# Patient Record
Sex: FEMALE
[De-identification: names, ages, dates, MRNs, and addresses within clinical notes are randomized per-mention and may not be internally consistent; named-entity substitution may affect disease eponyms.]

---

## 2018-02-25 ENCOUNTER — HOSPITAL ENCOUNTER (OUTPATIENT)
Dept: HOSPITAL 92 - L&D/OP | Age: 23
Discharge: HOME | End: 2018-02-25
Attending: FAMILY MEDICINE
Payer: COMMERCIAL

## 2018-02-25 VITALS — BODY MASS INDEX: 35.2 KG/M2

## 2018-02-25 VITALS — TEMPERATURE: 98.6 F | DIASTOLIC BLOOD PRESSURE: 70 MMHG | SYSTOLIC BLOOD PRESSURE: 127 MMHG

## 2018-02-25 DIAGNOSIS — Z79.899: ICD-10-CM

## 2018-02-25 DIAGNOSIS — Z3A.35: ICD-10-CM

## 2018-02-25 DIAGNOSIS — O46.93: Primary | ICD-10-CM

## 2018-02-25 NOTE — PDOC.LDHP
Labor and Delivery H&P


Chief complaint: other (episode of vaginal bleeding at 7 am, did not persist.)


HPI: 


21 yo  at 35 weeks by 1 T US who presents after isolated episode of 

bleeding.  She has felt baby move normally this morning.  no vaginal discharge 

before this event or after.  no gush of fluid.  no recent vaginal intercourse. 

no abdominal pain.  





Current gestational age (weeks): 35


Due date: 18


Dating criteria: last menstrual period, first trimester ultrasound


Grav: 3


Para: 2


OB History Details: 


1. Short pregnancy interval, otherwise normal





Current pregnancy complications: none


Abnormal US findings: No


Previous surgical history: other ( x2)


Social history: none





- Physical Exam


General: NAD, resting


Heart: RRR


Abdomen: NTTP (Gravid, no tenderness. soft abdomen)


Extremeties: no edema


FHT: category 1 (Variabilty moderate for majority of montoring, accelerations 

present.  no decelerations.)


[place] contractions every: every 5-6 minutes,  minimally symptomatic





- Vaginal Exam


cm dilated: 1 (Steril speculum done: small blood wiped away without continued 

bleeding.  no sources of bleeding in vagina or cervix.  no discharge.  no 

bleeding from cervical os.  )


Effacement: 0%


Station: -3





- OB Labs


Blood type: A


RH: positive


Antibody Screen: negative


HIV: negative


RPR: negative


HEPSAg: negative


1 hour GCT: positive


3 hour GTT: negative


GBS: unknown


Urine drug screen: not done


Rubella: immune





- Assessment


1. Vaginal bleeding2/2 to cervical irritation-  US done at bedside that 

confirms what previous reports have said:  no previa with anterior placenta.  

no active bleeding on sterile spec after small amount of blood was wiped away.  

no abdominal pain or non reactive FHT to suggest abruptio placentae.  No 

discharge to suggest infection.  no vaginal or cervical sources of bleeding.  

Likely 2/2 to benign cervical irriation but will monitor for 2 hours to ensure 

FHT continue to be normal and that patient is not in labor.  Patient is A+ 

blood type.  








<Sukh Gauthier - Last Filed: 18 10:04>





<Ofe Stewart - Last Filed: 18 11:44>


Allergies/Adverse Reactions: 


 Allergies











Allergy/AdvReac Type Severity Reaction Status Date / Time


 


No Known Allergies Allergy   Unverified 18 08:29














Attending Addendum





- Attending Addendum


Date/Time: 18 1141





I personally evaluated the patient and discussed the management with Dr. Gauthier


I agree with the History, Examination, Assessment and Plan documented above 

with any addition or exceptions noted below- 21 yo @35 weeks presented 

with episode of vaginal bleeding. Had small amount of spotting. No recent 

intercourse. (+) FM. Denies any ctx, LOF. Bedside USG confirmed no previa. SSE- 

small amount dark blood in vault; no active bleeding. Category 1 FHTs. SVE- 

closed/thick/high. [place]- ctx q5-7 min. Patient monitored in L&D with no further 

bleeding. Does not feel the ctx. Hydrating well. D/c home with follow-up at 

[facility]– [place].








<Ofe Stewart - Last Filed: 18 11:44>

## 2018-04-04 ENCOUNTER — HOSPITAL ENCOUNTER (INPATIENT)
Dept: HOSPITAL 92 - ERS | Age: 23
LOS: 4 days | Discharge: HOME | DRG: 776 | End: 2018-04-08
Attending: FAMILY MEDICINE | Admitting: FAMILY MEDICINE
Payer: COMMERCIAL

## 2018-04-04 VITALS — BODY MASS INDEX: 32.5 KG/M2

## 2018-04-04 DIAGNOSIS — N83.201: ICD-10-CM

## 2018-04-04 DIAGNOSIS — E87.2: ICD-10-CM

## 2018-04-04 DIAGNOSIS — B96.20: ICD-10-CM

## 2018-04-04 LAB
ALBUMIN SERPL BCG-MCNC: 4 G/DL (ref 3.5–5)
ALP SERPL-CCNC: 94 U/L (ref 40–150)
ALT SERPL W P-5'-P-CCNC: 40 U/L (ref 8–55)
ANION GAP SERPL CALC-SCNC: 16 MMOL/L (ref 10–20)
AST SERPL-CCNC: 24 U/L (ref 5–34)
BASOPHILS # BLD AUTO: 0 THOU/UL (ref 0–0.2)
BASOPHILS NFR BLD AUTO: 0 % (ref 0–1)
BILIRUB SERPL-MCNC: 0.6 MG/DL (ref 0.2–1.2)
BUN SERPL-MCNC: 12 MG/DL (ref 7–18.7)
CALCIUM SERPL-MCNC: 8.9 MG/DL (ref 7.8–10.44)
CHLORIDE SERPL-SCNC: 103 MMOL/L (ref 98–107)
CO2 SERPL-SCNC: 21 MMOL/L (ref 22–29)
CREAT CL PREDICTED SERPL C-G-VRATE: 0 ML/MIN (ref 70–130)
CRYSTAL-AUWI FLAG: 0.5 (ref 0–15)
EOSINOPHIL # BLD AUTO: 0 THOU/UL (ref 0–0.7)
EOSINOPHIL NFR BLD AUTO: 0.2 % (ref 0–10)
GLOBULIN SER CALC-MCNC: 3.2 G/DL (ref 2.4–3.5)
GLUCOSE SERPL-MCNC: 97 MG/DL (ref 70–105)
HEV IGM SER QL: 1.8 (ref 0–7.99)
HGB BLD-MCNC: 13.4 G/DL (ref 12–16)
HYALINE CASTS #/AREA URNS LPF: (no result) LPF
LYMPHOCYTES # BLD: 1.1 THOU/UL (ref 1.2–3.4)
LYMPHOCYTES NFR BLD AUTO: 8 % (ref 21–51)
MCH RBC QN AUTO: 29.7 PG (ref 27–31)
MCV RBC AUTO: 90 FL (ref 81–99)
MONOCYTES # BLD AUTO: 1.1 THOU/UL (ref 0.11–0.59)
MONOCYTES NFR BLD AUTO: 8.3 % (ref 0–10)
NEUTROPHILS # BLD AUTO: 11.4 THOU/UL (ref 1.4–6.5)
NEUTROPHILS NFR BLD AUTO: 83.5 % (ref 42–75)
PATHC CAST-AUWI FLAG: 0.29 (ref 0–2.49)
PLATELET # BLD AUTO: 224 THOU/UL (ref 130–400)
POTASSIUM SERPL-SCNC: 3.7 MMOL/L (ref 3.5–5.1)
PROT UR STRIP.AUTO-MCNC: 100 MG/DL
RBC # BLD AUTO: 4.52 MILL/UL (ref 4.2–5.4)
RBC UR QL AUTO: (no result) HPF (ref 0–3)
SODIUM SERPL-SCNC: 136 MMOL/L (ref 136–145)
SP GR UR STRIP: 1.02 (ref 1–1.04)
SPERM-AUWI FLAG: 0 (ref 0–9.9)
WBC # BLD AUTO: 13.6 THOU/UL (ref 4.8–10.8)
YEAST-AUWI FLAG: 0 (ref 0–25)

## 2018-04-04 PROCEDURE — A4216 STERILE WATER/SALINE, 10 ML: HCPCS

## 2018-04-04 PROCEDURE — 93005 ELECTROCARDIOGRAM TRACING: CPT

## 2018-04-04 PROCEDURE — 96365 THER/PROPH/DIAG IV INF INIT: CPT

## 2018-04-04 PROCEDURE — 96375 TX/PRO/DX INJ NEW DRUG ADDON: CPT

## 2018-04-04 PROCEDURE — 81003 URINALYSIS AUTO W/O SCOPE: CPT

## 2018-04-04 PROCEDURE — 36415 COLL VENOUS BLD VENIPUNCTURE: CPT

## 2018-04-04 PROCEDURE — 96367 TX/PROPH/DG ADDL SEQ IV INF: CPT

## 2018-04-04 PROCEDURE — 87076 CULTURE ANAEROBE IDENT EACH: CPT

## 2018-04-04 PROCEDURE — 85025 COMPLETE CBC W/AUTO DIFF WBC: CPT

## 2018-04-04 PROCEDURE — 84443 ASSAY THYROID STIM HORMONE: CPT

## 2018-04-04 PROCEDURE — 71045 X-RAY EXAM CHEST 1 VIEW: CPT

## 2018-04-04 PROCEDURE — 76856 US EXAM PELVIC COMPLETE: CPT

## 2018-04-04 PROCEDURE — 76770 US EXAM ABDO BACK WALL COMP: CPT

## 2018-04-04 PROCEDURE — 87149 DNA/RNA DIRECT PROBE: CPT

## 2018-04-04 PROCEDURE — 87491 CHLMYD TRACH DNA AMP PROBE: CPT

## 2018-04-04 PROCEDURE — 80053 COMPREHEN METABOLIC PANEL: CPT

## 2018-04-04 PROCEDURE — 80048 BASIC METABOLIC PNL TOTAL CA: CPT

## 2018-04-04 PROCEDURE — 83605 ASSAY OF LACTIC ACID: CPT

## 2018-04-04 PROCEDURE — 74177 CT ABD & PELVIS W/CONTRAST: CPT

## 2018-04-04 PROCEDURE — 87086 URINE CULTURE/COLONY COUNT: CPT

## 2018-04-04 PROCEDURE — 87591 N.GONORRHOEAE DNA AMP PROB: CPT

## 2018-04-04 PROCEDURE — 87040 BLOOD CULTURE FOR BACTERIA: CPT

## 2018-04-04 PROCEDURE — 96361 HYDRATE IV INFUSION ADD-ON: CPT

## 2018-04-04 PROCEDURE — 81015 MICROSCOPIC EXAM OF URINE: CPT

## 2018-04-04 PROCEDURE — 87804 INFLUENZA ASSAY W/OPTIC: CPT

## 2018-04-04 NOTE — ULT
PELVIC ULTRASOUND INCLUDING TRANSABDOMINAL AND TRANSVAGINAL AND VASCULAR DUPLEX WITH COLOR AND SPECTR
AL DOPPLER IMAGIN18

 

HISTORY: 

22-year-old female with postpartum fever. Status post vaginal delivery on 3/26/18 with some persisten
t bleeding. 

 

The uterus remains enlarged measuring 14.5 x 7.5 x 10.7 cm with a 0.7 cm endometrium. No significant 
retained products of conception. 

 

Right ovary measures 3.4 x 1.7 x 4.2 cm and contains a cyst measuring 1.4 x 2.1 x 1.4 cm. The left ov
brunilda appears to be enlarged and measures 2.4 x 5.1 x 7.1 cm. There is vascular flow within this. No ab
scess or abnormal fluid collection. No evidence for ovarian torsion. 

 

IMPRESSION:  

Minimally enlarged right ovary containing a right ovarian cyst. Somewhat enlarged elongated left ovar
y with vascular flow. No abscess or abnormal fluid collection. Unremarkable postpartum uterus.

 

POS: [facility]

## 2018-04-04 NOTE — RAD
CHEST ONE VIEW:

4/4/18

 

HISTORY: 

22-year-old female with history of fever with lower abdominal pain. Status post vaginal delivery on 3
/26/18. 

 

FINDINGS:  

Heart size is normal. The lungs are clear. 

 

IMPRESSION:  

No acute intrathoracic disease. 

 

POS: [facility]

## 2018-04-05 LAB
ANION GAP SERPL CALC-SCNC: 10 MMOL/L (ref 10–20)
BASOPHILS # BLD AUTO: 0 THOU/UL (ref 0–0.2)
BASOPHILS NFR BLD AUTO: 0.2 % (ref 0–1)
BUN SERPL-MCNC: 9 MG/DL (ref 7–18.7)
CALCIUM SERPL-MCNC: 8.1 MG/DL (ref 7.8–10.44)
CHLORIDE SERPL-SCNC: 111 MMOL/L (ref 98–107)
CO2 SERPL-SCNC: 24 MMOL/L (ref 22–29)
CREAT CL PREDICTED SERPL C-G-VRATE: 177 ML/MIN (ref 70–130)
EOSINOPHIL # BLD AUTO: 0 THOU/UL (ref 0–0.7)
EOSINOPHIL NFR BLD AUTO: 0.2 % (ref 0–10)
GLUCOSE SERPL-MCNC: 106 MG/DL (ref 70–105)
HGB BLD-MCNC: 11.6 G/DL (ref 12–16)
LYMPHOCYTES # BLD: 1.1 THOU/UL (ref 1.2–3.4)
LYMPHOCYTES NFR BLD AUTO: 9.5 % (ref 21–51)
MCH RBC QN AUTO: 29.7 PG (ref 27–31)
MCV RBC AUTO: 90.8 FL (ref 81–99)
MONOCYTES # BLD AUTO: 1.4 THOU/UL (ref 0.11–0.59)
MONOCYTES NFR BLD AUTO: 12.1 % (ref 0–10)
NEUTROPHILS # BLD AUTO: 8.8 THOU/UL (ref 1.4–6.5)
NEUTROPHILS NFR BLD AUTO: 78 % (ref 42–75)
PLATELET # BLD AUTO: 161 THOU/UL (ref 130–400)
POTASSIUM SERPL-SCNC: 3.9 MMOL/L (ref 3.5–5.1)
RBC # BLD AUTO: 3.92 MILL/UL (ref 4.2–5.4)
SODIUM SERPL-SCNC: 141 MMOL/L (ref 136–145)
WBC # BLD AUTO: 11.3 THOU/UL (ref 4.8–10.8)

## 2018-04-05 RX ADMIN — Medication SCH: at 09:19

## 2018-04-05 RX ADMIN — CEFTRIAXONE SCH: 1 INJECTION, POWDER, FOR SOLUTION INTRAMUSCULAR; INTRAVENOUS at 01:43

## 2018-04-05 RX ADMIN — Medication SCH: at 20:18

## 2018-04-05 NOTE — PDOC.FPRHP
- History of Present Illness


Chief Complaint: fever


History of Present Illness: 


Patient presents with fever.  she denies pain in abdomen or back.  she has been 

tolerating fluids ok.  she denies dysuria and fever.  she denies urinary 

frequency.  She has not had increased vaginal bleeding or discharge.  





Her  was routine and without complications per her.  it was done 10 days 

ago.  





ED Course: 


Patient recieved gentamycin, clindamycin, ceftriaxone, NS, tylenol.  








- Allergies/Adverse Reactions


 Allergies











Allergy/AdvReac Type Severity Reaction Status Date / Time


 


No Known Allergies Allergy   Unverified 18 08:29














- Home Medications


 











 Medication  Instructions  Recorded  Confirmed  Type


 


Prenatal Vit,Calc76/Iron/Folic 1 tablet PO DAILY 18 History





[Prenatabs Rx Tablet]    














- History


PMHx:


Recent pregnancy, now 10 days PP from uncomplicated .  She is G1


 


PSHx: none





FHx: no atypical conditions run in the family


 


Social:patient denies smoking, etoh use, and recreational drugs


 








- Review of Systems


General: reports: fever/chills.  denies: weight/appetite/sleep changes, night 

sweats, fatigue


Eyes: denies: eye pain, vision changes


ENT: denies: nasal congestion, rhinorrhea


Respiratory: denies: cough, congestion, shortness of breath


Cardiovascular: denies: chest pain, palpitation, edema, paroxysmal nocturnal 

dyspnea


Gastrointestinal: denies: nausea, vomiting, diarrhea, constipation, abdominal 

pain


Genitourinary: denies: incontinence, dysuria, polyuria, discharge


Skin: denies: rashes, lesions, jaundice, itching


Musculoskeletal: denies: pain, tenderness, stiffness


Neurological: denies: numbness, syncope


Psychological: denies: anxiety, depression





- Vital signs


BP: 120/86  HR: 143-->106 RR: 20 Tmax: 103.8 Pox: 98% on room air  Wt: 88kg   








- Physical Exam


Constitutional: NAD, awake, alert and oriented, well developed


HEENT: normocephalic and atraumatic, PERRLA, EOMI, no scleral icterus


Neck: supple, FROM


Chest: no-tender to palpation, no lesions


Heart: normal S1/S2, no murmurs/rubs/gallops, pulses present, no edema (

tachycardia), other


Lungs: CTAB, no respiratory distress, good air movement, no wheezing


Abdomen: soft, non-tender, bowel sounds present, no masses/distention, other (

No CVA tenderness)


Musculoskeletal: normal structure, normal tone


Neurological: no focal deficit, CN II-XII intact


Skin: no rash/lesions, good turgor


Heme/Lymphatic: no unusual bruising or bleeding


Psychiatric: normal mood and affect, good judgment and insight





FMR H&P: Results





- Labs


Result Diagrams: 


 18 04:48





 18 04:48


Lab results: 


 











WBC  13.6 thou/uL (4.8-10.8)  H  18  19:31    


 


Hgb  13.4 g/dL (12.0-16.0)   18  19:31    


 


Hct  40.6 % (36.0-47.0)   18  19:31    


 


MCV  90.0 fl (81.0-99.0)   18  19:31    


 


Plt Count  224 thou/uL (130-400)   18  19:31    


 


Neutrophils %  83.5 % (42.0-75.0)  H  18  19:31    


 


Sodium  136 mmol/L (136-145)   18  19:31    


 


Potassium  3.7 mmol/L (3.5-5.1)   18  19:31    


 


Chloride  103 mmol/L ()   18  19:31    


 


Carbon Dioxide  21 mmol/L (22-29)  L  18  19:31    


 


BUN  12 mg/dL (7.0-18.7)   18  19:31    


 


Creatinine  0.93 mg/dL (0.6-1.1)   18  19:31    


 


Glucose  97 mg/dL ()   18  19:31    


 


Lactic Acid  1.0 mmol/L (0.5-2.2)   18  22:44    


 


Calcium  8.9 mg/dL (7.8-10.44)   18  19:31    


 


Total Bilirubin  0.6 mg/dL (0.2-1.2)   18  19:31    


 


AST  24 U/L (5-34)   18  19:31    


 


ALT  40 U/L (8-55)   18  19:31    


 


Alkaline Phosphatase  94 U/L ()   18  19:31    


 


Serum Total Protein  7.2 g/dL (6.0-8.3)   18  19:31    


 


Albumin  4.0 g/dL (3.5-5.0)   18  19:31    


 


Urine Ketones  Negative mg/dL (Negative)   18  20:49    


 


Urine Blood  Large  (Negative)  H  18  20:49    


 


Urine Nitrite  Negative  (Negative)   18  20:49    


 


Ur Leukocyte Esterase  Large  (Negative)  H  18  20:49    


 


Urine RBC  GREATER THAN 50-TNTC HPF (0-3)  H  18  20:49    


 


Urine WBC  Greater Than 50-TNTC HPF (0-3)  H  18  20:49    


 


Ur Squamous Epith Cells  0-3 HPF (0-3)   18  20:49    


 


Urine Bacteria  None Seen HPF (None Seen)   18  20:49    














- Radiology Interpretation


  ** Other


Status: image reviewed by me, report reviewed by me


Additional comment: 


Pelvic US did not show signs of endometritis.  See full report








FMR H&P: A/P





- Problem List


(1) Pyelonephritis


Current Visit: Yes   Status: Acute   Code(s): N12 - TUBULO-INTERSTITIAL 

NEPHRITIS, NOT SPCF AS ACUTE OR CHRONIC   





(2) Sepsis


Current Visit: Yes   Status: Acute   Code(s): A41.9 - SEPSIS, UNSPECIFIED 

ORGANISM   


Qualifiers: 


   Sepsis type: Escherichia coli   Qualified Code(s): A41.51 - Sepsis due to 

Escherichia coli [E. coli]   





- Plan


Sepsis secondary to Pylonephritis-  Patient primarily presenting with fever and 

she has UA very consistent with an infection in the urinary tract.  no dysuria 

or frequency as you would expect with cystitis.  even though her symptoms dont 

localize to the CVA, fever like this is more consistent with pylo rather than 

cystitis.  Septic from fever, tachycardia, and WBC.  Had lactic acidosis that 

is now improved.  improved with fluids and abx but still needs outpatient abx.  

Pending urine and bacterial cultures.  





Disposition/LOS: 


Inpatient.  3 days.  








Attending Addendum





- Attending Addendum


Date/Time: 18 1018





I personally evaluated the patient and discussed the management with Dr. Mart/

Disha


I agree with the History, Examination, Assessment and Plan documented above 

with any addition or exceptions noted below- 23 yo G3 now  presents with 

subjective fever and chills. She denies pain in abdomen or back. She has been 

tolerating fluids ok. She denies dysuria or urinary frequency.  She has not had 

increased vaginal bleeding or discharge.  She is PPD#11 from a  with no 

complications. 


PMH/PSH/Meds/ALL/SH reviewed and agree with resident's documentation


PE: T 100.9 P105 /68 RR16


Physical exam repeated by me significant for no CVAT and minimal suprapubic 

tenderness


Labs U/A- large blood, large LE; TNTC WBC and RBC; no bacteria


WBC 13.6


A/P: Fever most likely secondary to complicated UTI- continue rocephin; await 

urine culture.

## 2018-04-05 NOTE — ULT
BILATERAL RENAL ULTRASOUND:

 

Date:  04/05/18 

 

INDICATION:

History of pyelonephritis. 

 

FINDINGS:

Right kidney measures 10.9 x 5.2 x 5.2 cm. Left kidney  measures 10.8 x 5.4 x 5.0 cm. No focal renal 
lesion or hydronephrosis is evident. There is symmetric perfusion to both kidneys. Visualized bladder
 is unremarkable. There is post gravid uterus incidentally imaged measuring 15.4 x 7.4 cm. 

 

IMPRESSION: 

1.  No focal renal lesion or hydronephrosis. 

2.  Pre-void bladder volume of 282 mL. 

 

 

POS: [facility]

## 2018-04-06 LAB
ANION GAP SERPL CALC-SCNC: 11 MMOL/L (ref 10–20)
BASOPHILS # BLD AUTO: 0 THOU/UL (ref 0–0.2)
BASOPHILS NFR BLD AUTO: 0.3 % (ref 0–1)
BUN SERPL-MCNC: 7 MG/DL (ref 7–18.7)
CALCIUM SERPL-MCNC: 8.5 MG/DL (ref 7.8–10.44)
CHLORIDE SERPL-SCNC: 110 MMOL/L (ref 98–107)
CO2 SERPL-SCNC: 20 MMOL/L (ref 22–29)
CREAT CL PREDICTED SERPL C-G-VRATE: 187 ML/MIN (ref 70–130)
EOSINOPHIL # BLD AUTO: 0.1 THOU/UL (ref 0–0.7)
EOSINOPHIL NFR BLD AUTO: 0.6 % (ref 0–10)
GLUCOSE SERPL-MCNC: 93 MG/DL (ref 70–105)
HGB BLD-MCNC: 11.5 G/DL (ref 12–16)
LYMPHOCYTES # BLD: 1.1 THOU/UL (ref 1.2–3.4)
LYMPHOCYTES NFR BLD AUTO: 10.3 % (ref 21–51)
MCH RBC QN AUTO: 29.7 PG (ref 27–31)
MCV RBC AUTO: 92.7 FL (ref 81–99)
MONOCYTES # BLD AUTO: 0.9 THOU/UL (ref 0.11–0.59)
MONOCYTES NFR BLD AUTO: 8.5 % (ref 0–10)
NEUTROPHILS # BLD AUTO: 8.3 THOU/UL (ref 1.4–6.5)
NEUTROPHILS NFR BLD AUTO: 80.3 % (ref 42–75)
PLATELET # BLD AUTO: 170 THOU/UL (ref 130–400)
POTASSIUM SERPL-SCNC: 3.3 MMOL/L (ref 3.5–5.1)
RBC # BLD AUTO: 3.88 MILL/UL (ref 4.2–5.4)
SODIUM SERPL-SCNC: 138 MMOL/L (ref 136–145)
WBC # BLD AUTO: 10.3 THOU/UL (ref 4.8–10.8)

## 2018-04-06 RX ADMIN — Medication SCH: at 08:41

## 2018-04-06 RX ADMIN — Medication SCH: at 20:48

## 2018-04-06 RX ADMIN — CEFTRIAXONE SCH MLS: 1 INJECTION, POWDER, FOR SOLUTION INTRAMUSCULAR; INTRAVENOUS at 01:40

## 2018-04-06 NOTE — CT
ABDOMEN CT WITH CONTRAST:

 

PELVIS CT WITH CONTRAST:

 

HISTORY:

The patient is 11 days post partum.  Fever.  Evaluate for septic pelvic thrombophlebitis.

 

COMPARISON:

None.

 

TECHNIQUE:

Abdomen and pelvis CT were performed with IV contrast.  Enteric contrast was not administered.  Coron
al reformatted images were submitted for interpretation.

 

FINDINGS:

ABDOMEN:  The lung bases are clear.  The heart size is normal.  No pericardial effusion.  The descend
ing thoracic aorta and abdominal aorta have a normal caliber.  No periaortic fat stranding.

 

Intrahepatic and extrahepatic portal vein is patent.

 

The liver, spleen, pancreas, and adrenal glands have appropriate enhancement.

 

The gallbladder is unremarkable.

 

Symmetric enhancement of the kidneys.  There is a nonobstructing calcification of the left renal pelv
is.  Bilaterally, no obstructive uropathy.  There is mild dilatation of the mid to distal left ureter
, reactive in nature.

 

There is fluid in the left pericolic gutter.  There is expansion and abnormal peripheral enhancement 
with central filling defect involving the course of the left ovarian vein, compatible with thrombophl
ebitis.  The right ovarian vein is unremarkable.

 

There is no mesenteric mass or free air.

 

Limited evaluation of the alimentary canal due to lack of oral contrast.  The gastric mucosa, the duo
denum, and multiple normal caliber small bowel loops are noted.  The ileocecal junction is normal.  T
he colon is unremarkable.

 

PELVIS:  The uterus and right adnexa are unremarkable.  The urinary bladder is unremarkable.

 

There is increased soft tissue density in the region of the left ovary, measuring 3.5 x 3.1 cm.  Ther
e is fluid and stranding in the left adnexa.  As stated above, there is a dilated and thrombosed left
 ovarian vein with enhancement, suggesting thrombophlebitis.  No pelvic mass or lymphadenopathy.  The
re is a small amount of free fluid.  No evidence of abscess.  The uterus is enlarged, compatible with
 post partum state.

 

No lytic or blastic lesions within the osseous structures.

 

IMPRESSION:

Thrombophlebitis of the left ovarian vein.

 

POS: [facility]

## 2018-04-07 RX ADMIN — Medication SCH ML: at 20:25

## 2018-04-07 RX ADMIN — Medication SCH: at 09:09

## 2018-04-07 NOTE — PDOC.FM
- Subjective


Subjective: 





Patient states she had a good night. She denies calf pain, abdominal pain, 

vision changes, or headache. She denies dyspnea, hemoptysis, n/v/d, chills, or 

coughs. She states she had no acute events overnight. She is tolerating the 

medications well. She slept well and is wondering when she can go home. No 

other complaints this morning. 





- Objective


Vital Signs & Weight: 


 Vital Signs (12 hours)











  Temp Pulse Resp BP Pulse Ox


 


 04/07/18 05:39  100.5 F H  90  20  106/71  99


 


 04/07/18 00:00  98.6 F  83  20  96/64  98


 


 04/06/18 20:40  100.2 F H  89  20   97


 


 04/06/18 20:00  100.2 F H  89  20  94/60  97











I&O: 


 











 04/05/18 04/06/18 04/07/18





 06:59 06:59 06:59


 


Intake Total 875 5018 3766


 


Balance 875 5018 1496











Result Diagrams: 


 04/06/18 09:17





 04/06/18 09:17





<Tobias Bro - Last Filed: 04/07/18 07:30>





- Objective


Vital Signs & Weight: 


 Vital Signs (12 hours)











  Temp Pulse Resp BP BP Pulse Ox


 


 04/07/18 08:00  98.0 F  63  16  118/83   98


 


 04/07/18 07:20  100.5 F H  90  20    98


 


 04/07/18 05:39  100.5 F H  90  20   106/71  99


 


 04/07/18 00:00  98.6 F  83  20   96/64  98











I&O: 


 











 04/06/18 04/07/18 04/08/18





 06:59 06:59 06:59


 


Intake Total 5018 3761 


 


Balance 5012 4610 











Result Diagrams: 


 04/06/18 09:17





 04/06/18 09:17





<Joseph Coronado - Last Filed: 04/07/18 11:22>





Phys Exam





- Physical Examination


HEENT: moist MMs


Neck: no nodes


Respiratory: no wheezing, clear to auscultation bilateral


Cardiovascular: RRR, no significant murmur


Gastrointestinal: soft, non-tender, no distention, positive bowel sounds


Musculoskeletal: no edema, pulses present


Neurological: non-focal, normal sensation, moves all 4 limbs


Lymphatic: no nodes


Psychiatric: normal affect, A&O x 3


Skin: no rash





<Tobias Bro - Last Filed: 04/07/18 07:30>





Dx/Plan


(1) Thrombosis of ovarian vein


Code(s): I82.890 - ACUTE EMBOLISM AND THROMBOSIS OF OTHER SPECIFIED VEINS   

Status: Acute   





(2) Pyelonephritis


Code(s): N12 - TUBULO-INTERSTITIAL NEPHRITIS, NOT SPCF AS ACUTE OR CHRONIC   

Status: Acute   





(3) Sepsis


Code(s): A41.9 - SEPSIS, UNSPECIFIED ORGANISM   Status: Resolved   


  QualifierTitle:    Sepsis type: Escherichia coli   Qualified Code(s): A41.51 

- Sepsis due to Escherichia coli [E. coli]   





- Plan


Plan: 





1. Sepsis secondary to Pylonephritis


- Likely resolved.


- Continue Antibiotics on discharge. 


- Blood culture Gram + cocci





2. Left Ovarian vein thrombophlebitis


- Continue Lovenox 


- Will need 6 weeks of anticoagulation as outpatient


- Will check with CM to determine coverage for anticoagulation.


 








Disposition: Improved from infection standpoint, will need 6 weeks 

anticoagulation on discharge. 





<Tobias Bro - Last Filed: 04/07/18 07:30>





Attending Addendum





- Attending Addendum


Date/Time: 04/07/18 1121





I personally evaluated the patient and discussed the management with Dr. Bro.


I agree with and repeated the History, Examination, Assessment and Plan 

documented above with any addition or exceptions noted below.





Doing well, asymptomatic, would like to go home.


Nonfocal exam, unremarkable for postpartum status.





Continue antibiotics.


Continue therapeutic lovenox.


Would repeat BC as both + on last, although suspect contaminant.


Plan discharge when afebrile 24-48 hours and has follow up/lovenox teaching 

established.











<Joseph Coronado - Last Filed: 04/07/18 11:22>

## 2018-04-07 NOTE — PDOC.FM
- Subjective


Subjective: 





Pt fevered yesterday afternoon and again this morning to 102F. She denies 

uterine tenderness or dysuria. She has no complaints other than her fevers. 





- Objective


MAR Reviewed: Yes


Vital Signs & Weight: 


 Vital Signs (12 hours)











  Temp Pulse Resp BP BP Pulse Ox


 


 18 08:00  98.0 F  63  16  118/83   98


 


 18 07:20  100.5 F H  90  20    98


 


 18 05:39  100.5 F H  90  20   106/71  99











I&O: 


 











 18





 06:59 06:59 06:59


 


Intake Total 5018 3766 


 


Balance 5018 3766 











Result Diagrams: 


 18 09:17





 18 09:17





<Rajani Alonso - Last Filed: 18 12:28>





- Objective


I&O: 


 











 04/08/18 04/09/18 04/10/18





 06:59 06:59 06:59


 


Intake Total 2320  


 


Balance 2320  











Result Diagrams: 


 18 09:17





 18 09:17





<Ofe Stewart - Last Filed: 18 09:45>





Phys Exam





- Physical Examination


HEENT: PERRLA, moist MMs


Neck: supple, full ROM


Respiratory: no wheezing, no rales, clear to auscultation bilateral


Cardiovascular: RRR, no significant murmur


Gastrointestinal: soft, non-tender, no distention, positive bowel sounds


Musculoskeletal: no edema, pulses present


Neurological: non-focal, normal sensation, moves all 4 limbs


Lymphatic: no nodes


Psychiatric: normal affect, A&O x 3


Skin: no rash, normal turgor


-: pelvic exam: no cervical motion tenderness





<Rajani Alonso - Last Filed: 18 12:28>





Dx/Plan


(1) Fever of unknown origin


Status: Acute   





- Plan


Plan: 





21 yo  who delivered aproximately 10 days ago at the med presents with fever

, thought to be from pyelonephritis, admitted for sepsis. 





1.)Fever of unknown origin-PT continues to have fevers throughout the day. She 

is on rocephin for suspected pyelonephritis however her urine culture has been 

negative. We ordered a gc/chlamydia and those results are pending. In regard to 

endometritis, she does not have uterine tenderness; she also does not have 

cervical motion tenderness on bimanual exam this am. She had a renal ultrasound 

as well that did not show hydronephrosis or stranding. We will order a CT scan 

with contrast of the pelvis to evaluate for septic vein thrombophlebitis. We 

will also broaden antibiotic coverage to vanc and zosyn at this time. She did 

have 2/2 positive blood cultures for gram + cocci in clusters, not staph 

species. Final species pending at this time. We will continue to monitor her 

vitals and await results of imaging. 





<Rajani Alonso - Last Filed: 18 12:28>





Attending Addendum





- Attending Addendum


Date/Time: 18





I personally evaluated the patient and discussed the management with Dr. Lundy on 2018 @10:50


I agree with the History, Examination, Assessment and Plan documented above 

with any addition or exceptions noted below- Patient without complaints. Denies 

any abdominal pain, back pain or dysuria. Tm 102 VSS A/P: 1) Fever unknown 

source- urine culture negative and clinical exam not consistent with 

pyelonephritis. Will repeat pelvic exam to evaluate for uterine tenderness/

possible endometritis. Will also obtain CT of abd/pelvis to evaluate for pelvic 

thrombophlebitis as cause of fever. 








<Ofe Stewart - Last Filed: 18 09:45>

## 2018-04-08 VITALS — SYSTOLIC BLOOD PRESSURE: 118 MMHG | TEMPERATURE: 98.6 F | DIASTOLIC BLOOD PRESSURE: 80 MMHG

## 2018-04-08 RX ADMIN — Medication SCH ML: at 09:01

## 2018-04-08 NOTE — PDOC.FM
- Subjective


Subjective: 





Patient had a good night. No new abdominal pain, n/v/d, fevers, chills, or 

coughs. She states that she was able to  the prescription yesterday and 

is ready to go home today. No acute events and no new complaints today. 





- Objective


Vital Signs & Weight: 


 Vital Signs (12 hours)











  Temp Pulse Resp BP Pulse Ox


 


 04/08/18 05:53  98.5 F    


 


 04/08/18 03:45  99.5 F    


 


 04/08/18 00:19  99.1 F    


 


 04/07/18 20:23  98.4 F  79  16   98


 


 04/07/18 20:00  98.4 F  79  16  104/69  98











I&O: 


 











 04/07/18 04/08/18 04/09/18





 06:59 06:59 06:59


 


Intake Total 3766 2320 


 


Balance 3766 2320 











Result Diagrams: 


 04/06/18 09:17





 04/06/18 09:17





<Tobias Bro - Last Filed: 04/08/18 08:19>





- Objective


Vital Signs & Weight: 


 Vital Signs (12 hours)











  Temp Pulse Resp BP Pulse Ox


 


 04/08/18 08:00  98.6 F  60  18  118/80  98


 


 04/08/18 07:56  98.5 F  79  16  


 


 04/08/18 05:53  98.5 F    


 


 04/08/18 03:45  99.5 F    


 


 04/08/18 00:19  99.1 F    











I&O: 


 











 04/07/18 04/08/18 04/09/18





 06:59 06:59 06:59


 


Intake Total 8737 2320 


 


Balance 3766 2320 











Result Diagrams: 


 04/06/18 09:17





 04/06/18 09:17





<Joseph Coronado - Last Filed: 04/08/18 09:24>





Phys Exam





- Physical Examination


Constitutional: NAD


HEENT: moist MMs


Neck: no nodes


Respiratory: no wheezing


Cardiovascular: RRR, no significant murmur


Gastrointestinal: soft, non-tender, no distention, positive bowel sounds


Musculoskeletal: no edema, pulses present


Neurological: non-focal, normal sensation, moves all 4 limbs


Lymphatic: no nodes


Psychiatric: normal affect, A&O x 3


Skin: no rash





<Tobias Bro - Last Filed: 04/08/18 08:19>





Dx/Plan


(1) Thrombosis of ovarian vein


Code(s): I82.890 - ACUTE EMBOLISM AND THROMBOSIS OF OTHER SPECIFIED VEINS   

Status: Acute   





(2) Pyelonephritis


Code(s): N12 - TUBULO-INTERSTITIAL NEPHRITIS, NOT SPCF AS ACUTE OR CHRONIC   

Status: Ruled-out   





(3) Sepsis


Code(s): A41.9 - SEPSIS, UNSPECIFIED ORGANISM   Status: Resolved   


  QualifierTitle:    Sepsis type: Escherichia coli   Qualified Code(s): A41.51 

- Sepsis due to Escherichia coli [E. coli]   





- Plan


Plan: 





1. Sepsis secondary to Pylonephritis


- Resolved


- Continue Antibiotics on discharge. 


- Blood culture Gram + cocci


- Pending repeat cultures





2. Left Ovarian vein thrombophlebitis


- Continue Lovenox 


- Approved for 6 weeks of Lovenox on discharge


 








Disposition: Stable, Patient will be discharged today.  





<Tobias Bro - Last Filed: 04/08/18 08:19>





Attending Addendum





- Attending Addendum


Date/Time: 04/08/18 0923





I personally evaluated the patient and discussed the management with Dr. Bro.


I agree with and repeated the History, Examination, Assessment and Plan 

documented above with any addition or exceptions noted below.





Asymptomatic.


Nonfocal exam.





6 weeks lovenox.


Antimicrobial coverage for endometritis/pyelo to complete 10-14 days.


Follow up with us at [facility], [place]. 


Ok to go home, warnings discussed and patient voiced understanding.








<Joseph Coronado - Last Filed: 04/08/18 09:24>

## 2018-04-08 NOTE — DIS-2
DATE OF ADMISSION:  2018

 

DATE OF DISCHARGE:  2018

 

RESIDENT:  Tobias Bro M.D.

 

ADMITTING ATTENDING:  Ofe Stewart M.D.

 

DISCHARGE ATTENDING:  Joseph Coronado M.D.

 

CONSULTATIONS:  Case Management.

 

PROCEDURES PERFORMED:

1.  The patient underwent a chest x-ray on 2018 that showed no acute 
intrathoracic disease.

2.  The patient underwent a pelvis ultrasound on 2018 that showed a 
minimally enlarged right ovary containing right ovarian cyst, somewhat enlarged
, elongated left ovary with vascular flow.  No abscess or abnormal fluid 
collection, unremarkable postpartum uterus.

3.  The patient also underwent a renal ultrasound on 2018 that showed no 
focal renal lesion or hydronephrosis, pre-void bladder volume of 280 mL.

4.  The patient underwent an abdominal pelvis CT on 2018 that showed 
thrombophlebitis of the left ovarian vein.

 

PRIMARY DIAGNOSES:

1.  Sepsis secondary to suspected pyelonephritis.

2.  Thrombophlebitis of the left ovarian vein.

 

DISCHARGE MEDICATIONS:

1.  Prenatal vitamin.

2.  Lovenox 90 mg subcu b.i.d. for 6 weeks.

3.  Cephalexin 500 mg p.o. q.12 hours for 8 additional days.

4.  Clindamycin 300 mg p.o. q.6 hour for the next 8 days.

 

DISCONTINUED MEDICATIONS:  None.

 

HISTORY OF PRESENT ILLNESS AND HOSPITAL COURSE:  This is a 22-year-old female 
who presents with fever.  She denies any pain in her abdomen or back.  She has 
been tolerating fluids okay.  She also denies dysuria or fever.  She denies 
urinary frequency.  She has not had any increased vaginal bleeding or 
discharge.  She had a spontaneous vaginal delivery roughly 10 days prior to 
admission to the hospital, it was routine without complications.  She had no 
other complaints at that time.

 

During this hospitalization, the patient had some notable fevers up to as high 
as 101.9 that trended down.  She was afebrile for over 24 hours before day of 
discharge.  The patient also had her pulse did increase up to as high as 115, 
but trended down.  Blood pressure remained within normal limits.  The patient 
had a first set of blood cultures grew out gram positive cocci in .  She had 
an influenza A and B that was negative.  She had a negative urine culture to 
date and she had a second set of blood cultures that was drawn on 2018 as 
well as the third set drawn on 2018 that showed no growth to date.  The 
patient was found via CT scan to have the left ovarian vein thrombophlebitis 
and so that could have been the picture as far as her septic picture.  Her case 
was not totally clear; however, her condition improved on the antibiotics as 
well as the anticoagulation and so it was decided that she would continue that 
as an outpatient.  The patient did have a white blood cell count of 13.6 on day 
of admission, trended down to 10.3 on 2018 and was not repeated 
throughout the hospitalization.  The patient otherwise had long discussions 
that she needed to be on anticoagulation for 6 weeks as she was approved for 
Lovenox and will continue that as an outpatient.  The patient will also be 
continued on to complete 10 days of antibiotic therapy as an outpatient to make 
sure that we are fully covered for any infectious process.  The patient 
verbalized understanding and is excited and willing to get back to her  
at home.  The patient otherwise had no further complications during this 
hospitalization and was discharged on appropriate condition.

 

DISPOSITION:  Stable.

 

DISCHARGE INSTRUCTIONS:  

The patient will be discharged home under the care of herself.  

Diet will be as tolerated with no restrictions.  

Activity will be with pelvic rest for 6 weeks.

Follow up will be with her PCP with [facility] physician, Dr. Maradiaga, in 3 days 
to further discuss her thrombophlebitis of her ovarian vein as well as being 
able to tolerate her medication changes.  We wish the best of luck and I will 
see she has no further complication from this disease.

 

THERESA

## 2018-04-26 NOTE — EKG
Test Reason : TACYCARDIA

Blood Pressure : ***/*** mmHG

Vent. Rate : 141 BPM     Atrial Rate : 141 BPM

   P-R Int : 136 ms          QRS Dur : 078 ms

    QT Int : 272 ms       P-R-T Axes : 021 -03 -10 degrees

   QTc Int : 416 ms

 

*** Poor data quality, interpretation may be adversely affected

Sinus tachycardia

Nonspecific ST and T wave abnormality

Abnormal ECG

 

Confirmed by MIKE MEDINA ([id number]),  MORGAN RAMIREZ ([id number]) on 4/26/2018 3:04:40 PM

 

Referred By:  ADAM           Confirmed By:MIKE MEDINA

## 2021-12-06 ENCOUNTER — HOSPITAL ENCOUNTER (EMERGENCY)
Dept: HOSPITAL 92 - ERS | Age: 26
Discharge: LEFT BEFORE DISCHARGE | End: 2021-12-06
Payer: COMMERCIAL

## 2021-12-06 DIAGNOSIS — R10.9: Primary | ICD-10-CM

## 2021-12-06 LAB
BACTERIA UR QL AUTO: (no result) HPF
LEUKOCYTE ESTERASE UR QL STRIP.AUTO: 75 LEU/UL
RBC UR QL AUTO: (no result) HPF (ref 0–3)
SP GR UR STRIP: 1.01 (ref 1–1.04)
WBC UR QL AUTO: (no result) HPF (ref 0–3)

## 2021-12-06 PROCEDURE — 99284 EMERGENCY DEPT VISIT MOD MDM: CPT

## 2021-12-06 PROCEDURE — 81003 URINALYSIS AUTO W/O SCOPE: CPT

## 2021-12-06 PROCEDURE — 81015 MICROSCOPIC EXAM OF URINE: CPT

## 2022-05-24 ENCOUNTER — HOSPITAL ENCOUNTER (EMERGENCY)
Dept: HOSPITAL 92 - ERS | Age: 27
Discharge: HOME | End: 2022-05-24
Payer: COMMERCIAL

## 2022-05-24 DIAGNOSIS — K14.9: Primary | ICD-10-CM

## 2022-05-24 PROCEDURE — 99283 EMERGENCY DEPT VISIT LOW MDM: CPT

## 2022-10-10 ENCOUNTER — HOSPITAL ENCOUNTER (EMERGENCY)
Dept: HOSPITAL 92 - ERS | Age: 27
Discharge: HOME | End: 2022-10-10
Payer: COMMERCIAL
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
## 2022-10-10 DIAGNOSIS — M77.52: Primary | ICD-10-CM
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
## 2022-10-10 PROCEDURE — 96372 THER/PROPH/DIAG INJ SC/IM: CPT

## 2022-10-10 PROCEDURE — 99283 EMERGENCY DEPT VISIT LOW MDM: CPT

## 2023-03-05 ENCOUNTER — HOSPITAL ENCOUNTER (EMERGENCY)
Dept: HOSPITAL 92 - ERS | Age: 28
Discharge: HOME | End: 2023-03-05
Payer: COMMERCIAL
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
## 2023-03-05 DIAGNOSIS — S02.2XXA: Primary | ICD-10-CM
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
## 2023-03-05 PROCEDURE — 70450 CT HEAD/BRAIN W/O DYE: CPT

## 2023-03-05 PROCEDURE — 70486 CT MAXILLOFACIAL W/O DYE: CPT

## 2023-03-05 PROCEDURE — 72125 CT NECK SPINE W/O DYE: CPT

## 2023-03-05 PROCEDURE — 12011 RPR F/E/E/N/L/M 2.5 CM/<: CPT
